# Patient Record
Sex: FEMALE | Race: WHITE | NOT HISPANIC OR LATINO | Employment: OTHER | URBAN - METROPOLITAN AREA
[De-identification: names, ages, dates, MRNs, and addresses within clinical notes are randomized per-mention and may not be internally consistent; named-entity substitution may affect disease eponyms.]

---

## 2024-10-08 ENCOUNTER — HOSPITAL ENCOUNTER (OUTPATIENT)
Dept: RADIOLOGY | Facility: CLINIC | Age: 79
Discharge: HOME | End: 2024-10-08
Payer: MEDICARE

## 2024-10-08 DIAGNOSIS — M79.671 PAIN IN RIGHT FOOT: ICD-10-CM

## 2024-10-08 PROCEDURE — 73630 X-RAY EXAM OF FOOT: CPT | Mod: RT

## 2024-10-08 PROCEDURE — 73630 X-RAY EXAM OF FOOT: CPT | Mod: RIGHT SIDE | Performed by: RADIOLOGY

## 2024-12-03 ENCOUNTER — TELEPHONE (OUTPATIENT)
Dept: PRIMARY CARE | Facility: CLINIC | Age: 79
End: 2024-12-03
Payer: MEDICARE

## 2024-12-03 DIAGNOSIS — J30.2 SEASONAL ALLERGIES: Primary | ICD-10-CM

## 2024-12-03 RX ORDER — MONTELUKAST SODIUM 10 MG/1
10 TABLET ORAL DAILY
Qty: 90 TABLET | Refills: 1 | Status: SHIPPED | OUTPATIENT
Start: 2024-12-03 | End: 2025-06-01

## 2024-12-03 RX ORDER — MONTELUKAST SODIUM 10 MG/1
10 TABLET ORAL DAILY
COMMUNITY
End: 2024-12-03 | Stop reason: SDUPTHER

## 2024-12-03 NOTE — TELEPHONE ENCOUNTER
Patient has an appt with Dr. Carranza 2/11/2024 she out of medicine the Generic Singular please send to [facility]

## 2024-12-19 DIAGNOSIS — I10 HTN, GOAL BELOW 130/80: Primary | ICD-10-CM

## 2024-12-19 RX ORDER — AMLODIPINE BESYLATE 10 MG/1
10 TABLET ORAL DAILY
COMMUNITY
End: 2024-12-19 | Stop reason: SDUPTHER

## 2024-12-19 RX ORDER — AMLODIPINE BESYLATE 10 MG/1
10 TABLET ORAL DAILY
Qty: 90 TABLET | Refills: 0 | Status: SHIPPED | OUTPATIENT
Start: 2024-12-19 | End: 2025-03-19

## 2024-12-19 NOTE — TELEPHONE ENCOUNTER
Pt has appt 2/11 requesting rf. She will be out in the next day or so. Following [facility] from old location.     Amlodipine - [facility]

## 2025-01-13 ENCOUNTER — TELEPHONE (OUTPATIENT)
Dept: PRIMARY CARE | Facility: CLINIC | Age: 80
End: 2025-01-13
Payer: MEDICARE

## 2025-01-13 DIAGNOSIS — J44.9 COPD MIXED TYPE (MULTI): Primary | ICD-10-CM

## 2025-01-13 RX ORDER — ALBUTEROL SULFATE 90 UG/1
2 INHALANT RESPIRATORY (INHALATION) EVERY 6 HOURS PRN
Qty: 18 G | Refills: 11 | Status: SHIPPED | OUTPATIENT
Start: 2025-01-13

## 2025-01-13 RX ORDER — ALBUTEROL SULFATE 90 UG/1
2 INHALANT RESPIRATORY (INHALATION) EVERY 6 HOURS PRN
COMMUNITY
End: 2025-01-13 | Stop reason: SDUPTHER

## 2025-01-13 NOTE — TELEPHONE ENCOUNTER
Pt called for rx refill on:    Pro Air Inhaler - Not shown in her chart    Send to:    [id number] - [place], [place] - [address]       Next OV: 02/11/25

## 2025-02-11 ENCOUNTER — APPOINTMENT (OUTPATIENT)
Dept: PRIMARY CARE | Facility: CLINIC | Age: 80
End: 2025-02-11
Payer: MEDICARE

## 2025-02-11 VITALS
WEIGHT: 111 LBS | TEMPERATURE: 98.6 F | HEART RATE: 88 BPM | HEIGHT: 58 IN | OXYGEN SATURATION: 96 % | SYSTOLIC BLOOD PRESSURE: 122 MMHG | DIASTOLIC BLOOD PRESSURE: 78 MMHG | BODY MASS INDEX: 23.3 KG/M2

## 2025-02-11 DIAGNOSIS — E55.9 VITAMIN D DEFICIENCY: ICD-10-CM

## 2025-02-11 DIAGNOSIS — J30.2 SEASONAL ALLERGIES: ICD-10-CM

## 2025-02-11 DIAGNOSIS — J44.9 COPD MIXED TYPE (MULTI): ICD-10-CM

## 2025-02-11 DIAGNOSIS — M06.9 RHEUMATOID ARTHRITIS, INVOLVING UNSPECIFIED SITE, UNSPECIFIED WHETHER RHEUMATOID FACTOR PRESENT (MULTI): ICD-10-CM

## 2025-02-11 DIAGNOSIS — E78.2 MIXED HYPERLIPIDEMIA: Primary | ICD-10-CM

## 2025-02-11 DIAGNOSIS — I10 HTN, GOAL BELOW 130/80: ICD-10-CM

## 2025-02-11 DIAGNOSIS — F33.9 MAJOR DEPRESSION, RECURRENT, CHRONIC (CMS-HCC): ICD-10-CM

## 2025-02-11 PROBLEM — E78.5 HYPERLIPIDEMIA: Status: ACTIVE | Noted: 2023-12-15

## 2025-02-11 PROCEDURE — 1159F MED LIST DOCD IN RCRD: CPT | Performed by: FAMILY MEDICINE

## 2025-02-11 PROCEDURE — 3078F DIAST BP <80 MM HG: CPT | Performed by: FAMILY MEDICINE

## 2025-02-11 PROCEDURE — 1036F TOBACCO NON-USER: CPT | Performed by: FAMILY MEDICINE

## 2025-02-11 PROCEDURE — G2211 COMPLEX E/M VISIT ADD ON: HCPCS | Performed by: FAMILY MEDICINE

## 2025-02-11 PROCEDURE — 3074F SYST BP LT 130 MM HG: CPT | Performed by: FAMILY MEDICINE

## 2025-02-11 PROCEDURE — 99214 OFFICE O/P EST MOD 30 MIN: CPT | Performed by: FAMILY MEDICINE

## 2025-02-11 PROCEDURE — 1160F RVW MEDS BY RX/DR IN RCRD: CPT | Performed by: FAMILY MEDICINE

## 2025-02-11 RX ORDER — BACLOFEN 10 MG/1
10 TABLET ORAL 2 TIMES DAILY
COMMUNITY
Start: 2024-09-10

## 2025-02-11 RX ORDER — SUCRALFATE 1 G/1
1 TABLET ORAL
COMMUNITY

## 2025-02-11 RX ORDER — DULOXETIN HYDROCHLORIDE 60 MG/1
60 CAPSULE, DELAYED RELEASE ORAL DAILY
COMMUNITY

## 2025-02-11 RX ORDER — AMLODIPINE BESYLATE 10 MG/1
10 TABLET ORAL DAILY
Qty: 90 TABLET | Refills: 1 | Status: SHIPPED | OUTPATIENT
Start: 2025-02-11 | End: 2025-08-10

## 2025-02-11 RX ORDER — GABAPENTIN 300 MG/1
300 CAPSULE ORAL 4 TIMES DAILY
COMMUNITY
Start: 2025-01-14 | End: 2025-07-13

## 2025-02-11 RX ORDER — ALBUTEROL SULFATE 90 UG/1
2 INHALANT RESPIRATORY (INHALATION) EVERY 6 HOURS PRN
Qty: 18 G | Refills: 11 | Status: SHIPPED | OUTPATIENT
Start: 2025-02-11

## 2025-02-11 RX ORDER — MONTELUKAST SODIUM 10 MG/1
10 TABLET ORAL DAILY
Qty: 90 TABLET | Refills: 1 | Status: SHIPPED | OUTPATIENT
Start: 2025-02-11 | End: 2025-08-10

## 2025-02-11 RX ORDER — FLUTICASONE PROPIONATE AND SALMETEROL 250; 50 UG/1; UG/1
1 POWDER RESPIRATORY (INHALATION)
Qty: 3 EACH | Refills: 1 | Status: SHIPPED | OUTPATIENT
Start: 2025-02-11 | End: 2025-05-12

## 2025-02-11 RX ORDER — DULOXETIN HYDROCHLORIDE 20 MG/1
20 CAPSULE, DELAYED RELEASE ORAL
COMMUNITY
Start: 2025-01-14

## 2025-02-11 RX ORDER — ATORVASTATIN CALCIUM 40 MG/1
40 TABLET, FILM COATED ORAL DAILY
COMMUNITY
End: 2025-02-11 | Stop reason: SDUPTHER

## 2025-02-11 RX ORDER — MIRTAZAPINE 45 MG/1
45 TABLET, FILM COATED ORAL NIGHTLY
Qty: 90 TABLET | Refills: 1 | Status: SHIPPED | OUTPATIENT
Start: 2025-02-11 | End: 2025-08-10

## 2025-02-11 RX ORDER — ATORVASTATIN CALCIUM 40 MG/1
40 TABLET, FILM COATED ORAL DAILY
Qty: 90 TABLET | Refills: 1 | Status: SHIPPED | OUTPATIENT
Start: 2025-02-11 | End: 2025-08-10

## 2025-02-11 RX ORDER — MIRTAZAPINE 45 MG/1
45 TABLET, FILM COATED ORAL NIGHTLY
COMMUNITY
End: 2025-02-11 | Stop reason: SDUPTHER

## 2025-02-11 ASSESSMENT — ENCOUNTER SYMPTOMS
CHILLS: 0
SLEEP DISTURBANCE: 0
ABDOMINAL PAIN: 0
ACTIVITY CHANGE: 0
LIGHT-HEADEDNESS: 0
SHORTNESS OF BREATH: 0
APPETITE CHANGE: 0
FEVER: 0
DIZZINESS: 0

## 2025-02-11 ASSESSMENT — PATIENT HEALTH QUESTIONNAIRE - PHQ9
1. LITTLE INTEREST OR PLEASURE IN DOING THINGS: NOT AT ALL
SUM OF ALL RESPONSES TO PHQ9 QUESTIONS 1 AND 2: 0
2. FEELING DOWN, DEPRESSED OR HOPELESS: NOT AT ALL

## 2025-02-11 NOTE — PROGRESS NOTES
"Subjective   Patient ID: Jocelyne Ahuja is a 79 y.o. female who presents for New Patient Visit, Hypertension (Recheck), and Depression.    HPI   Patient is a prior [facility] patient of mine last seen August 2024.  At that time, she saw me for transitional care visit.  She was admitted for altered mental status and was found to have leukemoid reaction.  At that time, she had quit smoking.  She was also found to have a foot fracture and was following up with podiatry.  Hemoglobin at discharge was 8.4 and when I repeated it in the clinic, was 7.5.  Her gastroenterologist performed EGD last July 2024 and noted she has angiodysplasias that were bleeding and clips were placed.  She was told to follow-up with GI.  I had called her gastroenterologist at that time and agreed that if her hemoglobin is less than 7, patient should go to the emergency room.  Her last hemoglobin was noted to be 7.5 from August 2024    She got discharged from [facility] last September 12, 2024 when she was admitted for recurrent C. difficile infection. She had seen infectious disease since     [facility] hematology forleuocytosis and anemia. Leukocytisus resokved, Resume ferrous sulfate twice daily Monthly CBC and follow-up in 6 months     \seeing pain management for spinal stenosis myalgia   On gabapentin 300 mg QID and baclofen 10mg bid. Cymbalta was increased to 80 mg   Last Hb 10.7     Review of Systems   Constitutional:  Negative for activity change, appetite change, chills and fever.   Eyes:  Negative for visual disturbance.   Respiratory:  Negative for shortness of breath.    Cardiovascular:  Negative for chest pain.   Gastrointestinal:  Negative for abdominal pain.   Skin:  Negative for rash.   Neurological:  Negative for dizziness and light-headedness.   Psychiatric/Behavioral:  Negative for sleep disturbance.        Objective   /78   Pulse 88   Temp 37 °C (98.6 °F)   Ht 1.461 m (4' 9.5\")   Wt 50.3 kg (111 lb)   " SpO2 96%   BMI 23.60 kg/m²     Physical Exam  Constitutional:       Appearance: Normal appearance.   Eyes:      Pupils: Pupils are equal, round, and reactive to light.   Cardiovascular:      Rate and Rhythm: Normal rate and regular rhythm.   Pulmonary:      Effort: Pulmonary effort is normal.      Breath sounds: Normal breath sounds.   Abdominal:      General: Abdomen is flat. Bowel sounds are normal.      Palpations: Abdomen is soft.   Musculoskeletal:         General: Normal range of motion.   Skin:     General: Skin is warm.   Neurological:      General: No focal deficit present.      Mental Status: She is alert.   Psychiatric:         Mood and Affect: Mood normal.         Assessment/Plan   Assessment & Plan  HTN, goal below 130/80  Controlled. No changes to blood pressure meds. Recommended to check her bp at home once a week. Goal less than 130/80    Orders:    amLODIPine (Norvasc) 10 mg tablet; Take 1 tablet (10 mg) by mouth once daily.    Comprehensive Metabolic Panel; Future    Lipid Panel; Future    Vitamin D 25-Hydroxy,Total (for eval of Vitamin D levels); Future    Seasonal allergies    Orders:    montelukast (Singulair) 10 mg tablet; Take 1 tablet (10 mg) by mouth once daily.    COPD mixed type (Multi)  Stable   Orders:    fluticasone propion-salmeteroL (Advair Diskus) 250-50 mcg/dose diskus inhaler; Inhale 1 puff 2 times a day. Rinse mouth with water after use to reduce aftertaste and incidence of candidiasis. Do not swallow.    albuterol 90 mcg/actuation inhaler; Inhale 2 puffs every 6 hours if needed for wheezing or shortness of breath.    Mixed hyperlipidemia  Obtain labs   Orders:    atorvastatin (Lipitor) 40 mg tablet; Take 1 tablet (40 mg) by mouth once daily.    Major depression, recurrent, chronic (CMS-HCC)  Stable   Orders:    mirtazapine (Remeron) 45 mg tablet; Take 1 tablet (45 mg) by mouth once daily at bedtime.    Rheumatoid arthritis, involving unspecified site, unspecified whether  rheumatoid factor present (Multi)  Cpntrolled   Cont seepain amangement        Vitamin D deficiency    Orders:    Vitamin D 25-Hydroxy,Total (for eval of Vitamin D levels); Future

## 2025-02-11 NOTE — ASSESSMENT & PLAN NOTE
Obtain labs   Orders:    atorvastatin (Lipitor) 40 mg tablet; Take 1 tablet (40 mg) by mouth once daily.

## 2025-02-11 NOTE — ASSESSMENT & PLAN NOTE
Stable   Orders:    fluticasone propion-salmeteroL (Advair Diskus) 250-50 mcg/dose diskus inhaler; Inhale 1 puff 2 times a day. Rinse mouth with water after use to reduce aftertaste and incidence of candidiasis. Do not swallow.    albuterol 90 mcg/actuation inhaler; Inhale 2 puffs every 6 hours if needed for wheezing or shortness of breath.

## 2025-05-12 ENCOUNTER — PATIENT OUTREACH (OUTPATIENT)
Dept: PRIMARY CARE | Facility: CLINIC | Age: 80
End: 2025-05-12
Payer: MEDICARE

## 2025-05-13 ENCOUNTER — TELEPHONE (OUTPATIENT)
Dept: PRIMARY CARE | Facility: CLINIC | Age: 80
End: 2025-05-13
Payer: MEDICARE

## 2025-05-13 RX ORDER — GUAIFENESIN 600 MG/1
1200 TABLET, EXTENDED RELEASE ORAL 2 TIMES DAILY
COMMUNITY
Start: 2025-05-09 | End: 2025-05-16

## 2025-05-13 RX ORDER — IPRATROPIUM BROMIDE AND ALBUTEROL SULFATE 2.5; .5 MG/3ML; MG/3ML
3 SOLUTION RESPIRATORY (INHALATION) EVERY 4 HOURS PRN
COMMUNITY
Start: 2025-05-08

## 2025-05-13 RX ORDER — VANCOMYCIN HYDROCHLORIDE 125 MG/1
125 CAPSULE ORAL 4 TIMES DAILY
COMMUNITY
Start: 2025-05-09

## 2025-05-13 RX ORDER — AZITHROMYCIN 500 MG/1
500 TABLET, FILM COATED ORAL
COMMUNITY
Start: 2025-05-10 | End: 2025-05-16 | Stop reason: ALTCHOICE

## 2025-05-13 NOTE — TELEPHONE ENCOUNTER
----- Message from Christina Carranza sent at 5/13/2025  9:49 AM EDT -----  Regarding: FW: TCM/ No appt  Ok to see this Friday fr 40 min. The first 3 pts sched are all family members. Pls make their appts next to each other (back to back) and inform them of the change in case they plan coming in separate. Thanks  ----- Message -----  From: Kristal Long  Sent: 5/13/2025   9:40 AM EDT  To: Christina Carranza MD  Subject: RE: TCM/ No appt                                 You do not have a 40 min OV available in the next 2 weeks.  Ok for 20 min OV?  Please advise.  Thanks  ----- Message -----  From: Leif Villeda LPN  Sent: 5/13/2025   9:33 AM EDT  To:  [email or web address] Clerical  Subject: TCM/ No appt                                     Discharge Facility: [facility] Discharge Diagnosis: Gen weakness Admission Date: 5/4/25Discharge Date: 5/9/25Can you please contact pt to schedule hosp follow up within 13 days of discharge 5/22/25Thank you

## 2025-05-13 NOTE — PROGRESS NOTES
Discharge Facility: [facility]   Discharge Diagnosis: Gen weakness   Admission Date: 5/4/25  Discharge Date: 5/9/25    PCP Appointment Date: Message routed to office for scheduling     Specialist Appointment Date:  6/3/25- Pain management   Hospital Encounter and Summary Linked: Hospital Encounter with Juan Fuller DO; Angelica Goncalves DO; Maurizio Bledsoe MD   See discharge assessment below for further details  Wrap Up  Wrap Up Additional Comments: Pt. Reports she is still having episodes of loose stools, and continues to take vanco per order. She states she is getting stronger, but still weak. Her  is in the home to help with ADLs. She has residence HC that has called to set up an appt, pt was sleeping and plans to call them back after this TCM call. Denies further questions/concerns at this time. This callers contact information for non-emergent questions/concerns. (5/13/2025  9:34 AM)    Engagement  Call Start Time: -- (Call completed with Jocelyne) (5/13/2025  9:34 AM)    Medications  Medications reviewed with patient/caregiver?: Yes (5/13/2025  9:34 AM)  Is the patient having any side effects they believe may be caused by any medication additions or changes?: No (5/13/2025  9:34 AM)  Does the patient have all medications ordered at discharge?: Yes (5/13/2025  9:34 AM)  Care Management Interventions: No intervention needed (5/13/2025  9:34 AM)  Prescription Comments: Pt. verbalized she has all discharge medications (5/13/2025  9:34 AM)  Is the patient taking all medications as directed (includes completed medication regime)?: Yes (5/13/2025  9:34 AM)  Medication Comments: Pt. denies questions/concerns with medication managment (5/13/2025  9:34 AM)    Appointments  Does the patient have a primary care provider?: Yes (5/13/2025  9:34 AM)  Care Management Interventions: Educated patient on importance of making appointment; Advised patient to make appointment (5/13/2025  9:34 AM)  Has the patient kept  scheduled appointments due by today?: Yes (5/13/2025  9:34 AM)  Care Management Interventions: Educated on importance of keeping appointment; Advised to reschedule appointment (5/13/2025  9:34 AM)    Self Management  What is the home health agency?: [facility] [phone] (5/13/2025  9:34 AM)  Has home health visited the patient within 72 hours of discharge?: Call prior to 72 hours (5/13/2025  9:34 AM)  What Durable Medical Equipment (DME) was ordered?: n/a (5/13/2025  9:34 AM)    Patient Teaching  Does the patient have access to their discharge instructions?: Yes (5/13/2025  9:34 AM)  Care Management Interventions: Reviewed instructions with patient (5/13/2025  9:34 AM)  What is the patient's perception of their health status since discharge?: Improving (5/13/2025  9:34 AM)  Is the patient/caregiver able to teach back the hierarchy of who to call/visit for symptoms/problems? PCP, Specialist, Home Health nurse, Urgent Care, ED, 911: Yes (5/13/2025  9:34 AM)  Patient/Caregiver Education Comments: Pt. denies any concerns post hospital discharge (5/13/2025  9:34 AM)

## 2025-05-16 ENCOUNTER — OFFICE VISIT (OUTPATIENT)
Dept: PRIMARY CARE | Facility: CLINIC | Age: 80
End: 2025-05-16
Payer: MEDICARE

## 2025-05-16 VITALS
BODY MASS INDEX: 25.09 KG/M2 | OXYGEN SATURATION: 96 % | DIASTOLIC BLOOD PRESSURE: 82 MMHG | WEIGHT: 118 LBS | SYSTOLIC BLOOD PRESSURE: 126 MMHG | TEMPERATURE: 97.3 F | HEART RATE: 91 BPM

## 2025-05-16 DIAGNOSIS — E78.2 MIXED HYPERLIPIDEMIA: Primary | ICD-10-CM

## 2025-05-16 DIAGNOSIS — F32.A DEPRESSIVE DISORDER: ICD-10-CM

## 2025-05-16 DIAGNOSIS — M06.9 RHEUMATOID ARTHRITIS OF HIP, UNSPECIFIED LATERALITY, UNSPECIFIED WHETHER RHEUMATOID FACTOR PRESENT (MULTI): ICD-10-CM

## 2025-05-16 DIAGNOSIS — J18.9 PNEUMONIA OF LEFT UPPER LOBE DUE TO INFECTIOUS ORGANISM: ICD-10-CM

## 2025-05-16 DIAGNOSIS — J44.9 COPD MIXED TYPE (MULTI): ICD-10-CM

## 2025-05-16 DIAGNOSIS — A04.72 C. DIFFICILE DIARRHEA: ICD-10-CM

## 2025-05-16 DIAGNOSIS — N17.9 AKI (ACUTE KIDNEY INJURY): ICD-10-CM

## 2025-05-16 PROBLEM — F17.200 NICOTINE DEPENDENCE: Status: ACTIVE | Noted: 2024-07-17

## 2025-05-16 PROCEDURE — 1159F MED LIST DOCD IN RCRD: CPT | Performed by: FAMILY MEDICINE

## 2025-05-16 PROCEDURE — 3079F DIAST BP 80-89 MM HG: CPT | Performed by: FAMILY MEDICINE

## 2025-05-16 PROCEDURE — 99495 TRANSJ CARE MGMT MOD F2F 14D: CPT | Performed by: FAMILY MEDICINE

## 2025-05-16 PROCEDURE — 3074F SYST BP LT 130 MM HG: CPT | Performed by: FAMILY MEDICINE

## 2025-05-16 PROCEDURE — 1036F TOBACCO NON-USER: CPT | Performed by: FAMILY MEDICINE

## 2025-05-16 ASSESSMENT — ENCOUNTER SYMPTOMS
ABDOMINAL PAIN: 0
DIARRHEA: 1
LIGHT-HEADEDNESS: 0
CHILLS: 0
SHORTNESS OF BREATH: 0
CONSTIPATION: 0
ACTIVITY CHANGE: 0
APPETITE CHANGE: 0
DIZZINESS: 0
FATIGUE: 1
SLEEP DISTURBANCE: 0
FEVER: 0
BLOOD IN STOOL: 0

## 2025-05-16 NOTE — PROGRESS NOTES
Subjective   Patient ID: Jocelyne Ahuja is a 79 y.o. female who presents for Hospital Follow-up ([facility] on 5/4/25 to 5/9/25 Re: Weakness).    HPI   Patient presents for hospital follow up.    Admission date:  5/4/25  Discharge date:   5/9/25  Discharge diagnosis: weakness, c diff   Telephone outreach with patient after discharge: 5/12/25  Face-to-Face visit date: 05/16/25    Medical complexity decision-making: moderate  Reviewed discharge summary, lab/test results, and hospital records.  Reconciled med list.   Specialist follow-ups? 6/3/25- Pain management   ID 5/29/25  Hematology 7/23/25     Per note review, ID is setting up fecal transplant for patient and patient agreeable   Also has PFTs to be scheduled at [facility]     Diagnosed with pneumonia with pulmonary infiltrates and was also septic   CTA CHEST (NONGATED) W IVCON PE 5/4/25   New parenchymal abnormalities most prominent in LEFT upper lobe and   lingula have appearance highly suspicious for pneumonia   She was treated for pneumonia   PFTs to be set up by pulm  Also noted on consult note that they will orer a follow up chest xray   Patient has pito using nebulizer at home prn   No sob, no fever no chills     KATHY (acute kidney injury) - N17.9  Found to also be hypokalemic and have hypomagnesemia     Diarrhea, found to have c-diff   vancomycin 125 mg po QID x 2 weeks followed by prolonged taper given recurrent C diff infection   ID consulted; recommneded fecal transplant     <> Major Depression, Recurrent, Chronic   On Cymbalta and Remeron     Also fell 3 days prior to admission; bruised forehead  CT BRAIN WO IVCON 5/4/2025   Chronic changes, including parenchymal:   Intracranial arterial wall calcifications. Dural calcifications noted.   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 Scattered mild microvascular ischemia.   There is mild generalized cerebral volume loss.   Per patient, her weakness improved.   Patient noted  to decline home physical therapy since she did not think she needed this    Med changes:   - amlodipine 10 mg  held but patient is now back on this. Does not check her bp at home   - duloxetine 80 mg > 60 mg now   - tabby 300 mg qid > tid   <> hx: previous stroke    <> meds changes   - amlodipine 10 mg held   - duloxetine 80 mg > 60 mg now   - tabby 300 mg qid > tid     No longer having swallowing issues   Potassium and magnesium normalized at the time of discharge and leukocytosis down trending     Review of Systems   Constitutional:  Positive for fatigue (improving). Negative for activity change, appetite change, chills and fever.   Eyes:  Negative for visual disturbance.   Respiratory:  Negative for shortness of breath.    Cardiovascular:  Negative for chest pain.   Gastrointestinal:  Positive for diarrhea (improving). Negative for abdominal pain, blood in stool and constipation.   Skin:  Negative for rash.   Neurological:  Negative for dizziness and light-headedness.   Psychiatric/Behavioral:  Negative for sleep disturbance.        Objective   /82   Pulse 91   Temp 36.3 °C (97.3 °F)   Wt 53.5 kg (118 lb)   SpO2 96%   BMI 25.09 kg/m²     Physical Exam  Constitutional:       Appearance: Normal appearance.   Eyes:      Pupils: Pupils are equal, round, and reactive to light.   Cardiovascular:      Rate and Rhythm: Normal rate and regular rhythm.   Pulmonary:      Effort: Pulmonary effort is normal.      Breath sounds: Normal breath sounds.   Abdominal:      General: Abdomen is flat. Bowel sounds are normal.      Palpations: Abdomen is soft.   Musculoskeletal:         General: Normal range of motion.   Skin:     General: Skin is warm.   Neurological:      General: No focal deficit present.      Mental Status: She is alert.   Psychiatric:         Mood and Affect: Mood normal.         Assessment/Plan   Assessment & Plan  Mixed hyperlipidemia         Depressive disorder         Rheumatoid arthritis of hip,  unspecified laterality, unspecified whether rheumatoid factor present (Multi)         COPD mixed type (Multi)         C. difficile diarrhea  Cont vanc   GI still has to call her to set up for fecal transplant set up  Has follow up with ID   Diarrhea improved but cont to have lose stools.       Pneumonia of left upper lobe due to infectious organism  Still no appt set up but per pt. Someone is supposed to call her to set this up. If she has not heard fr them in 2 wks, she will reach out to me   Pfts to be sched, cxr to be repeated by pulm on follow up   Patient no longer coughing and sob improved  Uses nebulizer every day   Not wheezing     KATHY (acute kidney injury)  Resolved      electrolyte abnormality reoslved

## 2025-05-28 ENCOUNTER — PATIENT OUTREACH (OUTPATIENT)
Dept: PRIMARY CARE | Facility: CLINIC | Age: 80
End: 2025-05-28
Payer: MEDICARE

## 2025-05-28 NOTE — PROGRESS NOTES
Unable to reach patient for follow up call after recent hospitalization.   Left voicemail with call back number for patient to call if needed  to assist with any questions or concerns patient may have.  Office Visit with Christina Carranza MD (05/16/2025)

## 2025-06-13 ENCOUNTER — PATIENT OUTREACH (OUTPATIENT)
Dept: PRIMARY CARE | Facility: CLINIC | Age: 80
End: 2025-06-13
Payer: MEDICARE

## 2025-06-13 NOTE — PROGRESS NOTES
Unable to reach patient for one month discharge follow up call.   Left voicemail with call back number for patient to call if needed

## 2025-08-11 ENCOUNTER — APPOINTMENT (OUTPATIENT)
Dept: PRIMARY CARE | Facility: CLINIC | Age: 80
End: 2025-08-11
Payer: MEDICARE

## 2025-08-11 VITALS
OXYGEN SATURATION: 99 % | DIASTOLIC BLOOD PRESSURE: 82 MMHG | WEIGHT: 124.8 LBS | SYSTOLIC BLOOD PRESSURE: 124 MMHG | BODY MASS INDEX: 26.93 KG/M2 | TEMPERATURE: 97.8 F | HEIGHT: 57 IN | HEART RATE: 83 BPM

## 2025-08-11 DIAGNOSIS — E78.2 MIXED HYPERLIPIDEMIA: ICD-10-CM

## 2025-08-11 DIAGNOSIS — I10 HTN, GOAL BELOW 130/80: ICD-10-CM

## 2025-08-11 DIAGNOSIS — J30.2 SEASONAL ALLERGIES: ICD-10-CM

## 2025-08-11 DIAGNOSIS — E55.9 VITAMIN D DEFICIENCY: ICD-10-CM

## 2025-08-11 DIAGNOSIS — Z11.59 NEED FOR HEPATITIS C SCREENING TEST: ICD-10-CM

## 2025-08-11 DIAGNOSIS — Z00.00 ROUTINE GENERAL MEDICAL EXAMINATION AT HEALTH CARE FACILITY: Primary | ICD-10-CM

## 2025-08-11 DIAGNOSIS — J44.9 COPD MIXED TYPE (MULTI): ICD-10-CM

## 2025-08-11 PROCEDURE — 1170F FXNL STATUS ASSESSED: CPT | Performed by: FAMILY MEDICINE

## 2025-08-11 PROCEDURE — 99397 PER PM REEVAL EST PAT 65+ YR: CPT | Performed by: FAMILY MEDICINE

## 2025-08-11 PROCEDURE — 99214 OFFICE O/P EST MOD 30 MIN: CPT | Performed by: FAMILY MEDICINE

## 2025-08-11 PROCEDURE — 3074F SYST BP LT 130 MM HG: CPT | Performed by: FAMILY MEDICINE

## 2025-08-11 PROCEDURE — 3079F DIAST BP 80-89 MM HG: CPT | Performed by: FAMILY MEDICINE

## 2025-08-11 PROCEDURE — 1160F RVW MEDS BY RX/DR IN RCRD: CPT | Performed by: FAMILY MEDICINE

## 2025-08-11 PROCEDURE — 1159F MED LIST DOCD IN RCRD: CPT | Performed by: FAMILY MEDICINE

## 2025-08-11 PROCEDURE — G0439 PPPS, SUBSEQ VISIT: HCPCS | Performed by: FAMILY MEDICINE

## 2025-08-11 RX ORDER — ATORVASTATIN CALCIUM 40 MG/1
40 TABLET, FILM COATED ORAL DAILY
Qty: 90 TABLET | Refills: 1 | Status: SHIPPED | OUTPATIENT
Start: 2025-08-11 | End: 2026-02-07

## 2025-08-11 RX ORDER — MONTELUKAST SODIUM 10 MG/1
10 TABLET ORAL DAILY
Qty: 90 TABLET | Refills: 1 | Status: SHIPPED | OUTPATIENT
Start: 2025-08-11 | End: 2026-02-07

## 2025-08-11 RX ORDER — FLUTICASONE PROPIONATE AND SALMETEROL 250; 50 UG/1; UG/1
1 POWDER RESPIRATORY (INHALATION)
Qty: 3 EACH | Refills: 1 | Status: SHIPPED | OUTPATIENT
Start: 2025-08-11 | End: 2025-11-09

## 2025-08-11 RX ORDER — AMLODIPINE BESYLATE 10 MG/1
10 TABLET ORAL DAILY
Qty: 90 TABLET | Refills: 1 | Status: SHIPPED | OUTPATIENT
Start: 2025-08-11 | End: 2026-02-07

## 2025-08-11 ASSESSMENT — ENCOUNTER SYMPTOMS
WEAKNESS: 0
HEADACHES: 0
FEVER: 0
DYSURIA: 0
HEMATURIA: 0
BLOOD IN STOOL: 0
NUMBNESS: 0
SLEEP DISTURBANCE: 0
DIZZINESS: 0
DIARRHEA: 0
ABDOMINAL PAIN: 0
SHORTNESS OF BREATH: 0
LIGHT-HEADEDNESS: 0
FREQUENCY: 0
NAUSEA: 0
CONSTIPATION: 0
CHILLS: 0
ARTHRALGIAS: 0
ACTIVITY CHANGE: 0
VOMITING: 0
APPETITE CHANGE: 0
TROUBLE SWALLOWING: 0

## 2025-08-11 ASSESSMENT — ACTIVITIES OF DAILY LIVING (ADL)
MANAGING_FINANCES: INDEPENDENT
TAKING_MEDICATION: INDEPENDENT
DOING_HOUSEWORK: INDEPENDENT
BATHING: INDEPENDENT
GROCERY_SHOPPING: NEEDS ASSISTANCE
DRESSING: INDEPENDENT

## 2025-08-11 ASSESSMENT — PATIENT HEALTH QUESTIONNAIRE - PHQ9
SUM OF ALL RESPONSES TO PHQ9 QUESTIONS 1 AND 2: 0
1. LITTLE INTEREST OR PLEASURE IN DOING THINGS: NOT AT ALL
2. FEELING DOWN, DEPRESSED OR HOPELESS: NOT AT ALL

## 2026-02-12 ENCOUNTER — APPOINTMENT (OUTPATIENT)
Dept: PRIMARY CARE | Facility: CLINIC | Age: 81
End: 2026-02-12
Payer: MEDICARE